# Patient Record
(demographics unavailable — no encounter records)

---

## 2024-10-14 NOTE — ASSESSMENT
[FreeTextEntry1] : 81 year old male is s/p Rt VATS, anatomical RU lobectomy, LN sampling, synchronous cardioversion on 10/9/17 for adenocarcinoma with acinar and solid features [id number].  Presents today for follow up with imaging.   I have independently reviewed the medical records and imaging at the time of this office consultation.  Recommendations reviewed with patient during this office visit, and all questions answered; Patient instructed on the importance of follow up and verbalizes understanding.

## 2024-10-14 NOTE — DATA REVIEWED
[FreeTextEntry1] : I have independently reviewed the following: CT Chest on 10/18/2024 MRI Head w/wo Contrast on 10/18/2024

## 2024-10-14 NOTE — HISTORY OF PRESENT ILLNESS
[FreeTextEntry1] : 81 year old male is s/p Rt VATS, anatomical RU lobectomy, LN sampling, synchronous cardioversion on 10/9/17 for adenocarcinoma with acinar and solid features [id number].   Of note, the patient was evaluated by Johny Steinberg MD Oncologist from [facility] [place] who recommended chemotherapy but he decided against chemotherapy. He is followed by Dr. Flores Diaz. He is undergoing immunotherapy with Dr. Charlie Jones.   CT chest April 5, 2022.  - No evidence of interstitial lung disease. - Trace right pleural effusion.  MR Head on 9/15/22: No abnormal enhancement suggest intracranial metastasis  CT Chest on 9/15/22: - Trace pericardial effusion. Aortic valve calcifications. Coronary artery calcifications present. - Postoperative changes following prior RULobectomy.  - Consolidation containing air bronchograms involving the entire RML with associated volume loss, new since prior   Interval Hx Covid 19 - May 2022.   Seen in September:  New consolidation involving the entire RML as well as trace right pleural effusion. Patient with recent hx of COVID and clinical pneumonia with cough and sputum now cleared. Recommendation for repeat CT Chest in 6 weeks to re-evaluate.   CT Chest on 10/13/2022: - Reexpansion and near resolved consolidation of the right middle lobe. Few small residual opacities.  Seen on 10/19/2022: CT chest revealing near resolved right middle lobe atelectasis and consolidation, I recommended patient to return to office in 6 months with CT Chest without contrast and MRI of brain w/w/o contrast.   CT Chest on 04/10/2023: - Stable postoperative changes following right upper lobectomy. - Interval resolution of the previously seen right middle lobe consolidation.   MRI Brain w/w/o Contrast on 04/10/2023: - No evidence of metastatic disease and no change compared with prior study dated 9/15/2022  CT Chest on 10/09/2023: - Right upper lobectomy. - No new disease in the chest.  MRI Head w/wo Contrast on 10/09/2023: - No evidence for intracranial metastasis.  CT Chest on 05/10/2024: - Status post right upper lobectomy with stable postoperative changes.  - 2 mm right lower lobe calcified granuloma is unchanged.  - Small focus of tubular calcification within the right lower lobe due to impacted distal airway is unchanged.  - No suspicious lung nodules.  MRI Head w/wo Contrast on 05/10/2024: - No evidence of a mass, abnormal enhancement, or current evidence of acute ischemia. - Stable chronic ischemic changes  CT Chest on 10/18/2024: -   MRI Head w/wo Contrast on 10/18/2024: -   Patient presents today for 6 months follow up.

## 2024-10-23 NOTE — ASSESSMENT
[FreeTextEntry1] : 81 year old male is s/p Rt VATS, anatomical RU lobectomy, LN sampling, synchronous cardioversion on 10/9/17 for adenocarcinoma with acinar and solid features [id number].  Presents today for follow up with imaging.   I have independently reviewed the medical records and imaging at the time of this office consultation. Imaging reviewed with patient, no evidence of recurrence. I discussed repeating CT Chest without contrast and MRI Head w/wo IV Contrast in 6 months for re-evaluation. He is agreeable.   Recommendations reviewed with patient during this office visit, and all questions answered; Patient instructed on the importance of follow up and verbalizes understanding.    I, RADHA Marti, personally performed the evaluation and management (E/M) services for this established patient. That E/M includes conducting the examination, assessing all new/exacerbated conditions, and establishing a new plan of care. Today, my ACP, Ankit Varma NP, was here to observe my evaluation and management services for this new problem/exacerbated condition to be followed going forward.

## 2024-10-23 NOTE — ASSESSMENT
[FreeTextEntry1] : 81 year old male is s/p Rt VATS, anatomical RU lobectomy, LN sampling, synchronous cardioversion on 10/9/17 for adenocarcinoma with acinar and solid features [id number].  Presents today for follow up with imaging.   I have independently reviewed the medical records and imaging at the time of this office consultation. Imaging reviewed with patient, no evidence of recurrence. I discussed repeating CT Chest without contrast and MRI Head w/wo IV Contrast in 6 months for re-evaluation. He is agreeable.   Recommendations reviewed with patient during this office visit, and all questions answered; Patient instructed on the importance of follow up and verbalizes understanding.    I, RADHA Marti, personally performed the evaluation and management (E/M) services for this established patient. That E/M includes conducting the examination, assessing all new/exacerbated conditions, and establishing a new plan of care. Today, my ACP, Ankit Varma NP, was here to observe my evaluation and management services for this new problem/exacerbated condition to be followed going forward.    yes

## 2024-10-23 NOTE — HISTORY OF PRESENT ILLNESS
[FreeTextEntry1] : 81 year old male is s/p Rt VATS, anatomical RU lobectomy, LN sampling, synchronous cardioversion on 10/9/17 for adenocarcinoma with acinar and solid features [id number].   Of note, the patient was evaluated by Johny Steinberg MD Oncologist from [facility] [place] who recommended chemotherapy but he decided against chemotherapy. He is followed by Dr. Flores Diaz. He is undergoing immunotherapy with Dr. Charlie Jones.   CT chest April 5, 2022.  - No evidence of interstitial lung disease. - Trace right pleural effusion.  MR Head on 9/15/22: No abnormal enhancement suggest intracranial metastasis  CT Chest on 9/15/22: - Trace pericardial effusion. Aortic valve calcifications. Coronary artery calcifications present. - Postoperative changes following prior RULobectomy.  - Consolidation containing air bronchograms involving the entire RML with associated volume loss, new since prior   Interval Hx Covid 19 - May 2022.   Seen in September:  New consolidation involving the entire RML as well as trace right pleural effusion. Patient with recent hx of COVID and clinical pneumonia with cough and sputum now cleared. Recommendation for repeat CT Chest in 6 weeks to re-evaluate.   CT Chest on 10/13/2022: - Reexpansion and near resolved consolidation of the right middle lobe. Few small residual opacities.  Seen on 10/19/2022: CT chest revealing near resolved right middle lobe atelectasis and consolidation, I recommended patient to return to office in 6 months with CT Chest without contrast and MRI of brain w/w/o contrast.   CT Chest on 04/10/2023: - Stable postoperative changes following right upper lobectomy. - Interval resolution of the previously seen right middle lobe consolidation.   MRI Brain w/w/o Contrast on 04/10/2023: - No evidence of metastatic disease and no change compared with prior study dated 9/15/2022  CT Chest on 10/09/2023: - Right upper lobectomy. - No new disease in the chest.  MRI Head w/wo Contrast on 10/09/2023: - No evidence for intracranial metastasis.  CT Chest on 05/10/2024: - Status post right upper lobectomy with stable postoperative changes.  - 2 mm right lower lobe calcified granuloma is unchanged.  - Small focus of tubular calcification within the right lower lobe due to impacted distal airway is unchanged.  - No suspicious lung nodules.  MRI Head w/wo Contrast on 05/10/2024: - No evidence of a mass, abnormal enhancement, or current evidence of acute ischemia. - Stable chronic ischemic changes  CT Chest on 10/18/2024: - Patient is status post right upper lobectomy.  - Tiny calcified nodules are noted in the right lower lobe.  MRI Head w/wo Contrast on 10/18/2024: - HALINA  Patient presents today for 6 months follow up. Overall, he reports to be feeling well. Denies any chest pain, shortness of breath, cough, or hemoptysis.

## 2024-10-23 NOTE — HISTORY OF PRESENT ILLNESS
[FreeTextEntry1] : 81 year old male is s/p Rt VATS, anatomical RU lobectomy, LN sampling, synchronous cardioversion on 10/9/17 for adenocarcinoma with acinar and solid features [id number].   Of note, the patient was evaluated by Johny Steinberg MD Oncologist from [facility] [place] who recommended chemotherapy but he decided against chemotherapy. He is followed by Dr. Flores Diaz. He is undergoing immunotherapy with Dr. Charlie Jones.   CT chest April 5, 2022.  - No evidence of interstitial lung disease. - Trace right pleural effusion.  MR Head on 9/15/22: No abnormal enhancement suggest intracranial metastasis  CT Chest on 9/15/22: - Trace pericardial effusion. Aortic valve calcifications. Coronary artery calcifications present. - Postoperative changes following prior RULobectomy.  - Consolidation containing air bronchograms involving the entire RML with associated volume loss, new since prior   Interval Hx Covid 19 - May 2022.   Seen in September:  New consolidation involving the entire RML as well as trace right pleural effusion. Patient with recent hx of COVID and clinical pneumonia with cough and sputum now cleared. Recommendation for repeat CT Chest in 6 weeks to re-evaluate.   CT Chest on 10/13/2022: - Reexpansion and near resolved consolidation of the right middle lobe. Few small residual opacities.  Seen on 10/19/2022: CT chest revealing near resolved right middle lobe atelectasis and consolidation, I recommended patient to return to office in 6 months with CT Chest without contrast and MRI of brain w/w/o contrast.   CT Chest on 04/10/2023: - Stable postoperative changes following right upper lobectomy. - Interval resolution of the previously seen right middle lobe consolidation.   MRI Brain w/w/o Contrast on 04/10/2023: - No evidence of metastatic disease and no change compared with prior study dated 9/15/2022  CT Chest on 10/09/2023: - Right upper lobectomy. - No new disease in the chest.  MRI Head w/wo Contrast on 10/09/2023: - No evidence for intracranial metastasis.  CT Chest on 05/10/2024: - Status post right upper lobectomy with stable postoperative changes.  - 2 mm right lower lobe calcified granuloma is unchanged.  - Small focus of tubular calcification within the right lower lobe due to impacted distal airway is unchanged.  - No suspicious lung nodules.  MRI Head w/wo Contrast on 05/10/2024: - No evidence of a mass, abnormal enhancement, or current evidence of acute ischemia. - Stable chronic ischemic changes  CT Chest on 10/18/2024: - Patient is status post right upper lobectomy.  - Tiny calcified nodules are noted in the right lower lobe.  MRI Head w/wo Contrast on 10/18/2024: - HALINA  Patient presents today for 6 months follow up. Overall, he reports to be feeling well. Denies any chest pain, shortness of breath, cough, or hemoptysis.  VTE Assessment already completed for this visit

## 2025-05-07 NOTE — HISTORY OF PRESENT ILLNESS
[FreeTextEntry1] : 82 year old male is s/p Rt VATS, anatomical RU lobectomy, LN sampling, synchronous cardioversion on 10/9/17 for adenocarcinoma with acinar and solid features [id number].   Of note, the patient was evaluated by Johny Steinberg MD Oncologist from [facility] [place] who recommended chemotherapy but he decided against chemotherapy. He is followed by Dr. Flores Diaz. He is undergoing immunotherapy with Dr. Charlie Jones.   Interval Hx Covid 19 - May 2022.   CT Chest on 05/10/2024: - Status post right upper lobectomy with stable postoperative changes.  - 2 mm right lower lobe calcified granuloma is unchanged.  - Small focus of tubular calcification within the right lower lobe due to impacted distal airway is unchanged.  - No suspicious lung nodules.  MRI Head w/wo Contrast on 05/10/2024: - No evidence of a mass, abnormal enhancement, or current evidence of acute ischemia. - Stable chronic ischemic changes  CT Chest on 10/18/2024: - Patient is status post right upper lobectomy.  - Tiny calcified nodules are noted in the right lower lobe.  MRI Head w/wo Contrast on 10/18/2024: - HALINA  CT Chest on 04/24/2025: - Changes of right upper lobectomy - No evidence of recurrent or metastatic disease in the chest.  MRI Head w/wo Contrast on 04/25/2025: - HALINA   Patient presents today for 6 months follow up. Overall, he reports to be feeling well. Denies any chest pain, shortness of breath, cough, or hemoptysis.

## 2025-05-07 NOTE — HISTORY OF PRESENT ILLNESS
[FreeTextEntry1] : 82 year old male is s/p Rt VATS, anatomical RU lobectomy, LN sampling, synchronous cardioversion on 10/9/17 for adenocarcinoma with acinar and solid features [id number].   Of note, the patient was evaluated by Johny Steinberg MD Oncologist from [facility] [place] who recommended chemotherapy but he decided against chemotherapy. He is followed by Dr. Flores Diaz. He is undergoing immunotherapy with Dr. Charlie Jones.   Interval Hx Covid 19 - May 2022.   CT Chest on 05/10/2024: - Status post right upper lobectomy with stable postoperative changes.  - 2 mm right lower lobe calcified granuloma is unchanged.  - Small focus of tubular calcification within the right lower lobe due to impacted distal airway is unchanged.  - No suspicious lung nodules.  MRI Head w/wo Contrast on 05/10/2024: - No evidence of a mass, abnormal enhancement, or current evidence of acute ischemia. - Stable chronic ischemic changes  CT Chest on 10/18/2024: - Patient is status post right upper lobectomy.  - Tiny calcified nodules are noted in the right lower lobe.  MRI Head w/wo Contrast on 10/18/2024: - HALINA  CT Chest on 04/24/2025: - Changes of right upper lobectomy - No evidence of recurrent or metastatic disease in the chest.  MRI Head w/wo Contrast on 04/25/2025: - HALINA   Patient presents today for 6 months follow up. Overall, he reports to be feeling well. Denies any chest pain, shortness of breath, cough, or hemoptysis.

## 2025-05-07 NOTE — DATA REVIEWED
[FreeTextEntry1] : I have independently reviewed the following: CT Chest on 04/24/2025 MRI Head w/wo Contrast on 04/25/2025

## 2025-05-07 NOTE — PHYSICAL EXAM
[Heart Rate And Rhythm] : heart rate was normal and rhythm regular [Heart Sounds] : normal S1 and S2 [Heart Sounds Gallop] : no gallops [Murmurs] : no murmurs [Heart Sounds Pericardial Friction Rub] : no pericardial rub [Examination Of The Chest] : the chest was normal in appearance [Chest Visual Inspection Thoracic Asymmetry] : no chest asymmetry [Diminished Respiratory Excursion] : normal chest expansion [] : no respiratory distress [Auscultation Breath Sounds / Voice Sounds] : lungs were clear to auscultation bilaterally

## 2025-05-07 NOTE — ASSESSMENT
[FreeTextEntry1] : 82 year old male is s/p Rt VATS, anatomical RU lobectomy, LN sampling, synchronous cardioversion on 10/9/17 for adenocarcinoma with acinar and solid features [id number].  Presents today for follow up with imaging.   I have independently reviewed the medical records and imaging at the time of this office consultation. Imaging reviewed with patient, no evidence of recurrence. I recommend he returns to clinic in 6 months with repeat CT Chest without contrast and MR Head w/wo IV Contrast. He is agreeable.   Recommendations reviewed with patient during this office visit, and all questions answered; Patient instructed on the importance of follow up and verbalizes understanding.    I, RADHA Marti, personally performed the evaluation and management (E/M) services for this established patient. That E/M includes conducting the examination, assessing all new/exacerbated conditions, and establishing a new plan of care. Today, my ACP, Ankit Varma NP, was here to observe my evaluation and management services for this new problem/exacerbated condition to be followed going forward.

## 2025-05-07 NOTE — ASSESSMENT
[FreeTextEntry1] : 82 year old male is s/p Rt VATS, anatomical RU lobectomy, LN sampling, synchronous cardioversion on 10/9/17 for adenocarcinoma with acinar and solid features [id number].  Presents today for follow up with imaging.   I have independently reviewed the medical records and imaging at the time of this office consultation. Imaging reviewed with patient, no evidence of recurrence. I recommend he returns to clinic in 6 months with repeat CT Chest without contrast and MR Head w/wo IV Contrast. He is agreeable.   Recommendations reviewed with patient during this office visit, and all questions answered; Patient instructed on the importance of follow up and verbalizes understanding.    I, RADHA Marti, personally performed the evaluation and management (E/M) services for this established patient. That E/M includes conducting the examination, assessing all new/exacerbated conditions, and establishing a new plan of care. Today, my ACP, Ankit Varma NP, was here to observe my evaluation and management services for this new problem/exacerbated condition to be followed going forward.